# Patient Record
Sex: MALE | Race: OTHER | Employment: STUDENT | URBAN - NONMETROPOLITAN AREA
[De-identification: names, ages, dates, MRNs, and addresses within clinical notes are randomized per-mention and may not be internally consistent; named-entity substitution may affect disease eponyms.]

---
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
## 2025-01-06 ENCOUNTER — OFFICE VISIT (OUTPATIENT)
Dept: PRIMARY CARE CLINIC | Age: 20
End: 2025-01-06
Payer: COMMERCIAL

## 2025-01-06 VITALS
WEIGHT: 269 LBS | RESPIRATION RATE: 18 BRPM | HEART RATE: 100 BPM | SYSTOLIC BLOOD PRESSURE: 114 MMHG | OXYGEN SATURATION: 99 % | DIASTOLIC BLOOD PRESSURE: 74 MMHG | BODY MASS INDEX: 35.65 KG/M2 | TEMPERATURE: 99.9 F | HEIGHT: 73 IN

## 2025-01-06 DIAGNOSIS — R50.9 FEVER, UNSPECIFIED FEVER CAUSE: ICD-10-CM

## 2025-01-06 DIAGNOSIS — R05.1 ACUTE COUGH: ICD-10-CM

## 2025-01-06 DIAGNOSIS — U07.1 COVID-19: Primary | ICD-10-CM

## 2025-01-06 LAB
INFLUENZA A ANTIGEN, POC: NEGATIVE
INFLUENZA B ANTIGEN, POC: NEGATIVE
LOT EXPIRE DATE: ABNORMAL
LOT KIT NUMBER: ABNORMAL
SARS-COV-2, POC: DETECTED
VALID INTERNAL CONTROL: ABNORMAL
VENDOR AND KIT NAME POC: ABNORMAL

## 2025-01-06 PROCEDURE — 87428 SARSCOV & INF VIR A&B AG IA: CPT

## 2025-01-06 PROCEDURE — 99213 OFFICE O/P EST LOW 20 MIN: CPT

## 2025-01-06 SDOH — ECONOMIC STABILITY: FOOD INSECURITY: WITHIN THE PAST 12 MONTHS, THE FOOD YOU BOUGHT JUST DIDN'T LAST AND YOU DIDN'T HAVE MONEY TO GET MORE.: NEVER TRUE

## 2025-01-06 SDOH — ECONOMIC STABILITY: FOOD INSECURITY: WITHIN THE PAST 12 MONTHS, YOU WORRIED THAT YOUR FOOD WOULD RUN OUT BEFORE YOU GOT MONEY TO BUY MORE.: NEVER TRUE

## 2025-01-06 SDOH — ECONOMIC STABILITY: INCOME INSECURITY: HOW HARD IS IT FOR YOU TO PAY FOR THE VERY BASICS LIKE FOOD, HOUSING, MEDICAL CARE, AND HEATING?: SOMEWHAT HARD

## 2025-01-06 ASSESSMENT — ENCOUNTER SYMPTOMS
DIARRHEA: 0
TROUBLE SWALLOWING: 0
CONSTIPATION: 0
COLOR CHANGE: 0
VOICE CHANGE: 0
SINUS PAIN: 0
SINUS PRESSURE: 0
RHINORRHEA: 1
NAUSEA: 0
VOMITING: 0
FACIAL SWELLING: 0
SORE THROAT: 0
EYE DISCHARGE: 0
COUGH: 1

## 2025-01-06 ASSESSMENT — PATIENT HEALTH QUESTIONNAIRE - PHQ9
SUM OF ALL RESPONSES TO PHQ QUESTIONS 1-9: 0
1. LITTLE INTEREST OR PLEASURE IN DOING THINGS: NOT AT ALL
SUM OF ALL RESPONSES TO PHQ QUESTIONS 1-9: 0
SUM OF ALL RESPONSES TO PHQ9 QUESTIONS 1 & 2: 0
SUM OF ALL RESPONSES TO PHQ QUESTIONS 1-9: 0
SUM OF ALL RESPONSES TO PHQ QUESTIONS 1-9: 0
2. FEELING DOWN, DEPRESSED OR HOPELESS: NOT AT ALL

## 2025-01-06 NOTE — PATIENT INSTRUCTIONS
May be treated with over the counter medications. (Sudafed, cold/ sinus, Mucinex, Ibuprofen)  Patient can use Tylenol and/or OTC cough syrup.   Advised to follow up with family doctor or return to urgent care if does not get better or symptoms worsen.  Pt can go to ER if high fever >102 , vomiting, breathing difficulty, lethargy.     Per the CDC, stay home and away from others until at least 24 hours after both their symptoms are getting better overall, and they have not had a fever

## 2025-01-06 NOTE — PROGRESS NOTES
Behavior: Behavior normal. Behavior is cooperative.         Thought Content: Thought content normal.         Judgment: Judgment normal.       Vitals:    25 1644   BP: 114/74   Site: Left Upper Arm   Position: Sitting   Cuff Size: Large Adult   Pulse: 100   Resp: 18   Temp: 99.9 °F (37.7 °C)   TempSrc: Tympanic   SpO2: 99%   Weight: 122 kg (269 lb)   Height: 1.854 m (6' 1\")         Assessment:       Diagnosis Orders   1. COVID-19        2. Acute cough  POCT COVID-19 & Influenza A/B      3. Fever, unspecified fever cause  POCT COVID-19 & Influenza A/B            Plan:   Assessment & Plan   Return if symptoms worsen or fail to improve.     Orders Placed This Encounter   Procedures    POCT COVID-19 & Influenza A/B     Order Specific Question:   Pregnant:     Answer:   No     Office Visit on 2025   Component Date Value Ref Range Status    SARS-COV-2, POC 2025 Detected (A)  Not Detected Final    Influenza A Antigen, POC 2025 Negative  Not Detected Final    Influenza B Antigen, POC 2025 Negative  Not Detected Final    Vendor and kit name 2025 Veritor   Final    Internal Control 2025 ok   Final    Lot/Kit Number [id number]   Final    Lot/Kit  date: 2025   Final     Discussed positive Covid POC results with patient. Declines Paxlovid at this time.  May be treated with over the counter medications. (Sudafed, cold/ sinus, Mucinex, Ibuprofen)  Patient can use Tylenol and/or OTC cough syrup.   Advised to follow up with family doctor or return to urgent care if does not get better or symptoms worsen.  Pt can go to ER if high fever >102 , vomiting, breathing difficulty, lethargy.     Per the CDC, stay home and away from others until at least 24 hours after both their symptoms are getting better overall, and they have not had a fever         Patient given educational materials - see patient instructions. Discussed use, benefits, and side effects of prescribed